# Patient Record
(demographics unavailable — no encounter records)

---

## 2024-10-14 NOTE — ASSESSMENT
[Treatment Education] : treatment education [Anticipatory Guidance] : anticipatory guidance [Smoking Cessation] : smoking cessation [FreeTextEntry1] : Patient with reportedly well controlled HIV on descovy and tivicay daily and prior h/o Hep B here for f/u visit  HIv is weell controlled and  he is compliant with medication Check labs cbc cmp HIV VL, CD4  c/w descovy and tivicay endo referral  PCP referral   f/u q6 months

## 2024-10-14 NOTE — PHYSICAL EXAM
[General Appearance - Alert] : alert [General Appearance - In No Acute Distress] : in no acute distress [Neck Appearance] : the appearance of the neck was normal [Neck Cervical Mass (___cm)] : no neck mass was observed [Jugular Venous Distention Increased] : there was no jugular-venous distention [Thyroid Diffuse Enlargement] : the thyroid was not enlarged [Auscultation Breath Sounds / Voice Sounds] : lungs were clear to auscultation bilaterally [Heart Rate And Rhythm] : heart rate was normal and rhythm regular [Heart Sounds] : normal S1 and S2 [Heart Sounds Gallop] : no gallops [Murmurs] : no murmurs [Heart Sounds Pericardial Friction Rub] : no pericardial rub [Bowel Sounds] : normal bowel sounds [Abdomen Soft] : soft [Abdomen Tenderness] : non-tender [Abdomen Mass (___ Cm)] : no abdominal mass palpated [Costovertebral Angle Tenderness] : no CVA tenderness [Skin Color & Pigmentation] : normal skin color and pigmentation [] : no rash [No Focal Deficits] : no focal deficits [FreeTextEntry1] : coarse breath sounds

## 2024-10-14 NOTE — HISTORY OF PRESENT ILLNESS
[FreeTextEntry1] : 58 M PMH  HLD HIV presenting to establish care for HIV  Patient was initially diagnosed in 1997 at [facility], started on treatment and then moved to [place]. received care from [facility]. Moved back out here to [place], but has to go out to [place] to [facility], seeing Dalila MartinezPearl Snow NP- [phone].    Reports being on descovy and tivicay, has been undetectable. He is compliant and has not missed any doses Previously on many different regimens, switched from  descovy to truvada and has been doing well  Recently saw GI for diarrhea-still having diarrhea for months, not everyday but thinks it has to do with what he eats Reports feeling tired, and thinks it may have to do with testosterone levels, has been off of it, Requesting PSA and testosterone levels to be tested  Meds: citalopram 40mg daily, rosuvastatin 10mg hd, descoy and tivicay, testosterone cream FH: lung ca father and sister SH: + smoker 1/2 pack per day x 40 y, does not want to try meds interested in gum, no etoh or drugs sexually active with male only but no partners at the moment  h/o hep b when he was 19, reportedly treated  friends/family aware of diagnosis  Interval f/u 10/14/24- here for f/u compliant with descovy and tivicay looking for new primary and endo for testosterone denies sexual partners had a cough and went to urgent care recently, was prescribed antibiotics which he used and received inhaler which he did not want to use usually has diarrhea, thinks may be from medications